# Patient Record
Sex: FEMALE | URBAN - NONMETROPOLITAN AREA
[De-identification: names, ages, dates, MRNs, and addresses within clinical notes are randomized per-mention and may not be internally consistent; named-entity substitution may affect disease eponyms.]

---

## 2017-01-01 ENCOUNTER — HISTORY (OUTPATIENT)
Dept: INTERNAL MEDICINE | Facility: OTHER | Age: 82
End: 2017-01-01

## 2017-01-01 ENCOUNTER — HISTORY (OUTPATIENT)
Dept: EMERGENCY MEDICINE | Facility: OTHER | Age: 82
End: 2017-01-01

## 2017-01-01 ENCOUNTER — OFFICE VISIT - GICH (OUTPATIENT)
Dept: INTERNAL MEDICINE | Facility: OTHER | Age: 82
End: 2017-01-01

## 2017-01-01 ENCOUNTER — AMBULATORY - GICH (OUTPATIENT)
Dept: GERIATRICS | Facility: OTHER | Age: 82
End: 2017-01-01

## 2017-01-01 ENCOUNTER — HOSPITAL - GICH (OUTPATIENT)
Dept: LAB | Facility: OTHER | Age: 82
End: 2017-01-01

## 2017-01-01 ENCOUNTER — AMBULATORY - GICH (OUTPATIENT)
Dept: SCHEDULING | Facility: OTHER | Age: 82
End: 2017-01-01

## 2017-01-01 ENCOUNTER — AMBULATORY - GICH (OUTPATIENT)
Dept: LAB | Facility: OTHER | Age: 82
End: 2017-01-01

## 2017-01-01 DIAGNOSIS — I77.1 STRICTURE OF ARTERY (H): ICD-10-CM

## 2017-01-01 DIAGNOSIS — R10.9 ABDOMINAL PAIN: ICD-10-CM

## 2017-01-01 DIAGNOSIS — I10 ESSENTIAL (PRIMARY) HYPERTENSION: ICD-10-CM

## 2017-01-01 DIAGNOSIS — Z78.9 OTHER SPECIFIED HEALTH STATUS (CODE): ICD-10-CM

## 2017-01-01 DIAGNOSIS — G47.9 SLEEP DISORDER: ICD-10-CM

## 2017-01-01 DIAGNOSIS — Z66 DO NOT RESUSCITATE: ICD-10-CM

## 2017-01-01 DIAGNOSIS — Z00.00 ENCOUNTER FOR GENERAL ADULT MEDICAL EXAMINATION WITHOUT ABNORMAL FINDINGS: ICD-10-CM

## 2017-01-01 DIAGNOSIS — R40.1 STUPOR: ICD-10-CM

## 2017-01-01 DIAGNOSIS — I35.9 NONRHEUMATIC AORTIC VALVE DISORDER: ICD-10-CM

## 2017-01-01 DIAGNOSIS — M53.3 SACROCOCCYGEAL DISORDERS, NOT ELSEWHERE CLASSIFIED: ICD-10-CM

## 2017-01-01 DIAGNOSIS — F02.818 DEMENTIA IN OTHER DISEASES CLASSIFIED ELSEWHERE WITH BEHAVIORAL DISTURBANCE: ICD-10-CM

## 2017-01-01 DIAGNOSIS — F41.9 ANXIETY DISORDER: ICD-10-CM

## 2017-01-01 DIAGNOSIS — G30.1 ALZHEIMER'S DISEASE WITH LATE ONSET (CODE) (H): ICD-10-CM

## 2017-01-01 DIAGNOSIS — Z86.73 PERSONAL HISTORY OF TRANSIENT ISCHEMIC ATTACK (TIA), AND CEREBRAL INFARCTION WITHOUT RESIDUAL DEFICITS: ICD-10-CM

## 2017-01-01 LAB
BACTERIA URINE: ABNORMAL BACTERIA/HPF
BILIRUB UR QL: NEGATIVE
CLARITY, URINE: CLEAR CLARITY
COLOR UR: YELLOW COLOR
EPITHELIAL CELLS: ABNORMAL EPI/HPF
GLUCOSE URINE: NEGATIVE MG/DL
KETONES UR QL: NEGATIVE MG/DL
LEUKOCYTE ESTERASE URINE: ABNORMAL
NITRITE UR QL STRIP: NEGATIVE
OCCULT BLOOD,URINE - HISTORICAL: NEGATIVE
PH UR: 6.5 [PH]
PROTEIN QUALITATIVE,URINE - HISTORICAL: NEGATIVE MG/DL
RBC - HISTORICAL: ABNORMAL /HPF
SODIUM SERPL-SCNC: 132 MMOL/L (ref 133–143)
SP GR UR STRIP: 1.01
UROBILINOGEN,QUALITATIVE - HISTORICAL: NORMAL EU/DL
WBC - HISTORICAL: ABNORMAL /HPF

## 2017-01-01 ASSESSMENT — PATIENT HEALTH QUESTIONNAIRE - PHQ9: SUM OF ALL RESPONSES TO PHQ QUESTIONS 1-9: 0

## 2018-01-03 NOTE — PROGRESS NOTES
Patient Information     Patient Name MRN Yolanda Bruce [id number] Female 1922      Progress Notes by Zakiya Johnson NP at 2017  4:00 AM     Author:  Zakiya Johnson NP Service:  (none) Author Type:  PHYS- Nurse Practitioner     Filed:  2017 12:42 PM Encounter Date:  2017 Status:  Signed     :  Zakiya Johnson NP (PHYS- Nurse Practitioner)            This note has been dictated. The encounter number is [phone].

## 2018-01-03 NOTE — H&P
Patient Information     Patient Name MRN Yolanda Bruce [id number] Female 1922      H&P signed by Zakiya Johnson NP at 2017  7:30 AM      Author:  Zakiya Johnson NP Service:  (none) Author Type:  PHYS- Nurse Practitioner     Filed:  2017  7:30 AM Encounter Date:  2017 Status:  Signed     :  Zakiya Johnson NP (PHYS- Nurse Practitioner)            -  DATE OF SERVICE:  2017    [facility]     CHIEF COMPLAINT  60 day recertification.     HISTORY OF PRESENT ILLNESS  In January Yolanda had 2 strokes. The first was a TIA and the second was a CVA. She has known aortic stenosis. Since her second CVA, she was placed on hospice. This started on February 3. At that time she had change in her medications. She is now only on amlodipine, Remeron, Keppra and morphine which she takes for back pain. Her daughter-in-law is present today and feels that she is doing well and has been comfortable. She has been applying a topical medicated ointment such as Bengay to her back and that seems to help. Yolanda does appear to be in her usual state of health today. She does have dementia and chronic anxiety. I spoke with daughter-in-law and she felt that amlodipine and Remeron should be continued. She has not had any episodes of seizure disorder. Past medical history, allergies, and medications are reviewed.     PHYSICAL EXAMINATION  GENERAL: Pleasant female, lying in bed. She appears comfortable.   VITAL SIGNS: Blood pressure 91/56. SPO2 95% on room air. Pulse 80, respiratory rate 18.   SKIN: Color pink.   MOUTH: Mucous membranes moist.   NECK: Supple, without adenopathy.   LUNGS: Fields clear to auscultation.   CARDIOVASCULAR: Regular rate and rhythm with a 3/6 systolic ejection murmur.   ABDOMEN: Soft and without masses, tenderness, and organomegaly.   EXTREMITIES: Without edema. She does not appear to have any discomfort at this time.     ASSESSMENT  1. Cerebrovascular  accident.   2. Aortic stenosis.   3. Hypertension.   4. Dementia.   5. Chronic anxiety disorder.     PLAN  We will just continue hospice services. This was discussed with daughter-in-law and family does want this to continue. No other medication changes at this time.     AMIRA ANAYA/laureano   D:  2017 11:45:00  T:  2017 11:53:04  Voice Job ID:  [id number]  Text Job ID:  [id number]  cc:NURSING HOME  LENORE GILLIS MD, PRIMARY PHYSICIAN         [facility] [place], [place]:  CELESTE VELA  MR#:  [id number]  :  1922  DATE:  2017  LOCATION:  [facility]  ROOM:    TYPE:  CLINURSING HOME REPORTPage 1 of 2

## 2018-01-03 NOTE — PROGRESS NOTES
Patient Information     Patient Name MRN Yolanda Bruce [id number] Female 1922      Progress Notes by Zakiya Johnson NP at 2017  5:40 AM     Author:  Zakiya Johnson NP Service:  (none) Author Type:  PHYS- Nurse Practitioner     Filed:  2017  7:29 AM Encounter Date:  2017 Status:  Signed     :  Zakiya Johnson NP (PHYS- Nurse Practitioner)            This note has been dictated. The encounter number is [phone].

## 2018-01-03 NOTE — PATIENT INSTRUCTIONS
Patient Information     Patient Name MRN Sex Yolanda Mac [id number] Female 1922      Patient Instructions by Khanh Lujan MD at 2017 11:10 AM     Author:  Khanh Lujan MD  Service:  (none) Author Type:  Physician     Filed:  2017 12:07 PM  Encounter Date:  2017 Status:  Addendum     :  Khanh Lujan MD (Physician)        Related Notes: Original Note by Khanh Lujan MD (Physician) filed at 2017 12:02 PM            Blood pressures are quite low at this time.     Stop:   - Chlorthalidone 25 mg daily  - Lisinopril 40 mg daily    Reduce Remeron to 7.5 mg at bedtime.     Change Ativan/Lorazepam to twice daily as needed for anxiety.    Change tramadol/Ultram to twice daily as needed for pain.    Consider possible seizures with history of TIA/mini strokes.  Start Keppra 500 mg twice daily.    Consider hospice consult if continued decline.  -- referral sent.     Bailee Johnson NP to see at [facility] in next 7-14 days    Return as needed for follow-up with Dr. Lujan.    Clinic : [phone]  Appointment line: [phone]

## 2018-01-03 NOTE — NURSING NOTE
Patient Information     Patient Name MRN Yolanda Bruce [id number] Female 1922      Nursing Note by Mela Shaffer at 2017 11:10 AM     Author:  Mela Shaffer Service:  (none) Author Type:  (none)     Filed:  2017 11:57 AM Encounter Date:  2017 Status:  Signed     :  Mela Shaffer            Patient presents to the clinic for follow up ED visit on 17, DX: TIA.  Family feels that patient is not as active now as she was prior to TIA's.    Mela Shaffer LPN        2017 11:40 AM

## 2018-01-03 NOTE — PROGRESS NOTES
Patient Information     Patient Name MRN Sex Yolanda Mac [id number] Female 1922      Progress Notes by Khanh Lujan MD at 2017 11:10 AM     Author:  Khanh Lujan MD Service:  (none) Author Type:  Physician     Filed:  2017  7:41 PM Encounter Date:  2017 Status:  Signed     :  Khanh Lujan MD (Physician)            Nursing Notes:   Mela Shaffer  2017 11:57 AM  Signed  Patient presents to the clinic for follow up ED visit on 17, DX: TIA.  Family feels that patient is not as active now as she was prior to TIA's.    Mela Shaffer LPN        2017 11:40 AM    Yolanda Chatman presents to clinic today for:   Chief Complaint    Patient presents with      Follow Up     HPI: Ms. Chatman is a 89 y.o. female who presents today for evaluation of above.     (R40.1) Obtundation  (primary encounter diagnosis)  (M53.3) Pain of both sacroiliac joints  (F41.9) Anxiety  (G47.9) Sleep difficulties  (Z86.73) History of TIA (transient ischemic attack)  (Z78.9) DNI (do not intubate)  (Z66) DNR (do not resuscitate)  (G30.1,  F02.81) Late onset Alzheimer's disease with behavioral disturbance - Advanced Dementia  (Z59.3) Nursing home resident - [facility]  (I77.1) Subclavian artery stenosis, right (HC) -- ONLY CHECK BLOOD PRESSURE ON -- LEFT -- ARM     Obtundation, patient presents from nursing home due to significant decline in function for the past 2-3 weeks.  Daughter-in-law is with today, Marina.  Patient is quite obtunded and is not able to participate in many history gathering.  She was last seen by myself within the last month or 2 and other than having severe anxiety, she was fairly mobile and functional.  She ambulated using her fetus in her wheelchair and was able to transfer herself to the bathroom.    There is some concern she may have had some TIAs or mini strokes recently.  There is also some concern she may be having some seizures.  No one has ever  really witness any seizure activity but she's been quite obtunded.  Evidently a couple weeks ago she had a syncopal episode, no injury was noted.  Family is concerned that there may been a TIA.  Patient has had TIAs in the past.    No recent medications were changed after recent ER visit.  She was given some IV fluids for IV hydration due to elevated BUN/creatinine other than this was continued on her chlorthalidone.    Uncontrolled hypertension, has been an issue.  Her medications have been slowly titrated over the last 6 months.  Currently on 25 mg her chlorthalidone, also taking lisinopril, metoprolol.    Regarding sedatives she is on mirtazapine/Remeron, also on tramadol, lorazepam/Ativan.    Patient has history of severe anxiety almost to the point of having severe behavioral disturbances.  She was having hallucinations at one point per family's recollection.    Per review of emergency room notes from 1/13/2017 patient had a brief unresponsive spell followed by left-sided weakness.  Evidently she was unresponsive for several minutes.  After she became a responsive once again she had some left-sided facial droop and weakness in her left arm.  No speech difficulties were noted per emergency room note.    She is brought into the emergency room by ambulance.  Symptoms had resolved within 10 or 15 minutes.      Rapid atrial flutter was noted on the ambulance cardiac monitor.  Rhythm strip was obtained.    Blood pressures cannot be obtained on her right arm due to subclavian stenosis.  She gets very anxious and her blood pressure skyrocket.  Recently medications have been titrated.    Today she is running quite low blood pressure, once again she is essentially obtunded however.  She is not able to really communicate.  She does get out a few mumbled/jumbled words but otherwise is sleeping and drooling.  Her nose is dripping with clear rhinorrhea.    Review of code status, DNR/DNI.  Patient appears to have advanced  dementia.  She currently resides at [facility].    Due to multiple new issues, reduced consciousness.  Needs multiple medication changes.    I was able to reach patient's daughter/POA-Ana Paula.  I spoke with Ana Paula for about 25 minutes today after meeting with her mother.  At this time, plan for medication changes as noted.  Conservative measures.  Either hospice admission versus comfort care medications.  Advised daughter/POA to meet and talk with hospice and find out if this would be a reasonable option versus simply comfort care measures being written by myself or Bailee Johnson NP.  Daughter had many questions.  She was satisfied with the plan is noted.  Hospice referral sent.    Ms. Chatman's There is no height or weight on file to calculate BMI. This is out of the normal range for a 89 y.o. Normal range for ages 18-64 is between 18.5 and 24.9; normal range for ages 65+ is 23-30. To lose weight we reviewed risks and benefits of appropriate options such as diet, exercise, and medications. Patient's strategy will be  none; patient is not ready to act   BP Readings from Last 1 Encounters:01/31/17 : 100/68  Ms. Madera blood pressure is within the normal range for adults. Per JNC-8 guidelines normal adult blood pressure is < 120/80, pre-hypertensive is between 120/80 and 139/89, and hypertension is 140/90 or greater.    SONIA:  SONIA-7 ANXIETY SCREENING 11/11/2015   SONIA date (doc flow) 11/11/2015   Nervous, anxious 3   Cannot stop worrying 3   Worry about different things 3   Cannot relax 3   Feeling restless 3   Easily annoyed/irritated 3   Afraid of awful event 2   Score 20   Severity severe anxiety       PHQ9:  PHQ Depression Screening 11/11/2015 1/31/2017   Date of PHQ exam (doc flow) 11/11/2015 1/31/2017   1. Lack of interest/pleasure 1 - Several days 0 - Not at all   2. Feeling down/depressed 1 - Several days 0 - Not at all   PHQ-2 TOTAL SCORE 2 0   3. Trouble sleeping 1 - Several days 0 - Not at all    4. Decreased energy 1 - Several days 0 - Not at all   5. Appetite change 0 - Not at all 0 - Not at all   6. Feelings of failure 0 - Not at all 0 - Not at all   7. Trouble concentrating 1 - Several days 0 - Not at all   8. Activity level 0 - Not at all 0 - Not at all   9. Hurting yourself 3 - Nearly every day 0 - Not at all   PHQ-9 TOTAL SCORE 8 0   PHQ-9 Severity Level mild none   Functional Impairment very difficult not difficult at all        I have personally reviewed the past medical history, past surgical history, medications, allergies, family and social history as listed below, on 2017.    Patient Active Problem List       Diagnosis  Date Noted     Obtundation  2017     Uncontrolled hypertension  2016     Subclavian artery stenosis, right (HC) -- ONLY CHECK BLOOD PRESSURE ON -- LEFT -- ARM  2016     Statin intolerance  2016     Onychomycosis  2015     SI joint arthritis (HC)  2015     Multiple actinic keratoses - Face / Nose - x4 on nose  2015     Grief reaction - Son Recently  as of 2014     Anxiety  10/28/2014     Nursing home resident - [facility]  2014     DNR (do not resuscitate)  2014     DNI (do not intubate)  2014     H/O: CVA (cerebrovascular accident)  2014     Chronic back pain  2014     ACP (advance care planning)  2013     HYPERTENSION       B12 DEFICIENCY       PAGET'S DISEASE       of the bone, stable          HYPERCHOLESTEROLEMIA       controlled          Alzheimer's dementia with behavioral disturbance - Advanced Dementia       DEGENERATIVE DISC DISEASE, LUMBAR SPINE       FAMILIAL TREMOR       AORTIC STENOSIS, MODERATE       Past Medical History      Diagnosis   Date     Hx of biopsy       breast, benign      X-ray exam       colon      Past Surgical History       Procedure   Laterality Date     Tonsillectomy        Hysterectomy        with appendectomy age 38       Flexible  sigmoidoscopy   2003     Colonoscopy screening   2004     & polypectomy (tubular adenoma) at 15 cm       Current Outpatient Prescriptions       Medication  Sig Dispense Refill     acetaminophen SR (TYLENOL ARTHRITIS) 650 mg Extended-Release tablet Take one tablet FOUR TIMES DAILY Max acetaminophen dose: 3000mg in 24 hrs.  0     amLODIPine (NORVASC) 10 mg tablet Take 1 tablet by mouth once daily.  0     aspirin 81 mg tablet Take 81 mg by mouth once daily with a meal.       Cholecalciferol, Vitamin D3, (VITAMIN D-3) 2,000 unit tablet Take 2,000 Units by mouth once daily.       cyanocobalamin 1000 mcg in 1 ml (VITAMIN B12) 1,000 mcg/mL Inject 1,000 mcg intramuscular every 4 weeks.       docusate (COLACE) 100 mg capsule Take 2 capsules by mouth once daily. While taking Ultram for constipation prevention -- Hold for Loose Stools 180 capsule 3     lanolin-mineral oil-nacl-petrolatum (LUBRISKIN) lotion Apply  topically to affected area(s) each time if needed for Dry Skin.  0     levETIRAcetam (KEPPRA) 500 mg tablet Take 1 tablet by mouth 2 times daily. - for possible seizures 60 tablet 3     LORazepam (ATIVAN) 0.5 mg tab 1/2 tablet (0.25 mg) every 12 hours prn for anxiety 10 tablet 3     medication order composer VITALITY ESSENTIAL PACKET  1 PACKET DAILY IN THE AM AND PM       metoprolol succinate (TOPROL XL) 100 mg Sustained-Release tablet 100 mg am and 50 mg at supper time 45 tablet 0     mirtazapine (REMERON) 7.5 mg tablet Take 1 tablet by mouth at bedtime. 30 tablet 1     traMADol (ULTRAM) 50 mg tablet Take 1 tablet by mouth 2 times daily. AS NEEDED for Pain - Dose change 1/31/2017 20 tablet 0     Vit Z-Ygp-Irgp-Bio-B&C-Hb 111 (VARISAN VITALITY) 86-35- unit-mg-mg-mg tab Vitality omega 3 cream delight -- give 2 tablespoons daily for health care maintenance.  0     Allergies     Allergen  Reactions     Lipitor [Atorvastatin] Confusion     Family History       Problem   Relation Age of Onset     Cancer-breast   "Sister      4, 1 alive, 2  of breast cancer       Stroke  Mother      from stroke       Other  Father      carcinoma unknown primary       Family Status     Relation  Status     Father  at age 70     Mother  at age 85     Sister      of breast cancer      Sister      of breast cancer      Sister Alive     Brother      Brother      Brother Alive     Brother Alive     Social History     Social History        Marital status:  Unknown     Spouse name: N/A     Number of children:  N/A     Years of education:  N/A     Social History Main Topics       Smoking status: Never Smoker     Smokeless tobacco: Never Used     Alcohol use No     Drug use: No     Sexual activity: Not on file     Other Topics  Concern     Not on file      Social History Narrative     Currently living at [facility].     Son helped with decisions and takes care of her personal dealings.      She is a . Lived in [place] most of life.        Daughter Ana Paula is POA -- home [phone] and mobile: [phone]     ROS -- unable, given patient's clinical status. + Obtundation, dementia.    EXAM:   Vitals:     17 1145   BP: 100/68   Pulse: 60   Temp: 97.7  F (36.5  C)   TempSrc: Tympanic     BP Readings from Last 3 Encounters:    17 100/68   17 126/72   17 103/59     Wt Readings from Last 3 Encounters:    17 72.6 kg (160 lb 0.9 oz)   17 72.6 kg (160 lb)   11/11/15 70 kg (154 lb 6 oz)     Estimated body mass index is 26.67 kg/(m^2) as calculated from the following:    Height as of 17: 1.65 m (5' 4.96\").    Weight as of 17: 72.6 kg (160 lb 0.9 oz).     EXAM:  Constitutional: Obtunded, sleeping, somewhat arousable but falls back to sleep.  Drooling from the mouth at times.  Clear rhinorrhea from the nose at times.  Currently in a wheelchair.  Lymphatic Exam: Non-palpable nodes in neck, clavicular regions  Pulmonary: Lungs are clear, no focal " rales or wheezing.  Cardiovascular Exam: Regular rate and rhythm.  Trace edema.  Musculoskeletal Exam: Head is leaned over weight to the left.  Manually attempting to help patient straighten her head she does fight somewhat but then it goes back to the same position.  Psychiatric Exam: Rather obtunded.  Minimally responsive.  She is arousable but falls back to sleep quickly.    INVESTIGATIONS:  Results for orders placed or performed during the hospital encounter of 01/27/17       aPTT      Result  Value Ref Range    APTT 26 26 - 39 sec   Comprehensive Metabolic Panel      Result  Value Ref Range    SODIUM 129 (L) 133 - 143 mmol/L    POTASSIUM 4.3 3.5 - 5.1 mmol/L    CHLORIDE 98 98 - 107 mmol/L    CO2,TOTAL 28 21 - 31 mmol/L    ANION GAP 3 (L) 5 - 18                    GLUCOSE 112 (H) 70 - 105 mg/dL    CALCIUM 9.1 8.6 - 10.3 mg/dL    BUN 35 (H) 7 - 25 mg/dL    CREATININE 1.64 (H) 0.70 - 1.30 mg/dL    BUN/CREAT RATIO           21                    GFR if African American 35 (L) >60 ml/min/1.73m2    GFR if not African American 29 (L) >60 ml/min/1.73m2    ALBUMIN 3.9 3.5 - 5.7 g/dL    PROTEIN,TOTAL 6.2 (L) 6.4 - 8.9 g/dL    GLOBULIN                  2.3 2.0 - 3.7 g/dL    A/G RATIO 1.7 1.0 - 2.0                    BILIRUBIN,TOTAL 0.3 0.3 - 1.0 mg/dL    ALK PHOSPHATASE 48 34 - 104 IU/L    ALT (SGPT) 29 7 - 52 IU/L    AST (SGOT) 23 13 - 39 IU/L   Protime - INR      Result  Value Ref Range    INR 1.1 <1.3    PROTIME 11.6 (L) 11.9 - 15.2 sec   Troponin I      Result  Value Ref Range    TROPONIN I <0.030 <0.034 ng/mL   CBC WITH AUTO DIFFERENTIAL      Result  Value Ref Range    WHITE BLOOD COUNT         7.7 4.5 - 11.0 thou/cu mm    RED BLOOD COUNT           3.98 (L) 4.00 - 5.20 mil/cu mm    HEMOGLOBIN                13.1 12.0 - 16.0 g/dL    HEMATOCRIT                39.4 33.0 - 51.0 %    MCV                       99 80 - 100 fL    MCH                       32.8 26.0 - 34.0 pg    MCHC                      33.2 32.0 - 36.0 g/dL     RDW                       11.8 11.5 - 15.5 %    PLATELET COUNT            238 140 - 440 thou/cu mm    MPV                       7.3 6.5 - 11.0 fL    NEUTROPHILS               48.0 42.0 - 72.0 %    LYMPHOCYTES               37.4 20.0 - 44.0 %    MONOCYTES                 9.0 <12.0 %    EOSINOPHILS               3.9 <8.0 %    BASOPHILS                 1.7 <3.0 %    ABSOLUTE NEUTROPHILS      3.7 1.7 - 7.0 thou/cu mm    ABSOLUTE LYMPHOCYTES      2.9 0.9 - 2.9 thou/cu mm    ABSOLUTE MONOCYTES        0.7 <0.9 thou/cu mm    ABSOLUTE EOSINOPHILS      0.3 <0.5 thou/cu mm    ABSOLUTE BASOPHILS        0.1 <0.3 thou/cu mm       ASSESSMENT AND PLAN:  Yolanda was seen today for follow up.    Diagnoses and all orders for this visit:    Obtundation    Pain of both sacroiliac joints  -     traMADol (ULTRAM) 50 mg tablet; Take 1 tablet by mouth 2 times daily. AS NEEDED for Pain - Dose change 1/31/2017    Anxiety  -     LORazepam (ATIVAN) 0.5 mg tab; 1/2 tablet (0.25 mg) every 12 hours prn for anxiety    Sleep difficulties  -     mirtazapine (REMERON) 7.5 mg tablet; Take 1 tablet by mouth at bedtime.    History of TIA (transient ischemic attack)  -     levETIRAcetam (KEPPRA) 500 mg tablet; Take 1 tablet by mouth 2 times daily. - for possible seizures    DNI (do not intubate)    DNR (do not resuscitate)    Late onset Alzheimer's disease with behavioral disturbance - Advanced Dementia  -     AMB CONSULT TO HOSPICE; Future    Nursing home resident - [facility]    Subclavian artery stenosis, right (HC) -- ONLY CHECK BLOOD PRESSURE ON -- LEFT -- ARM    -- Expected clinical course discussed     60 minutes total spent in face-to-face interaction with patient, daughter in law, and daughter (POA) Ana Paula (separate from separately billed procedures) with greater than 50% spent in counseling and care coordination of listed medical problems.      Return if symptoms worsen or fail to improve, for -- follow-up with Bailee Eagle in 7 to  14 days. .    Patient Instructions   Blood pressures are quite low at this time.     Stop:   - Chlorthalidone 25 mg daily  - Lisinopril 40 mg daily    Reduce Remeron to 7.5 mg at bedtime.     Change Ativan/Lorazepam to twice daily as needed for anxiety.    Change tramadol/Ultram to twice daily as needed for pain.    Consider possible seizures with history of TIA/mini strokes.  Start Keppra 500 mg twice daily.    Consider hospice consult if continued decline.  -- referral sent.     Bailee Johnson NP to see at [facility] in next 7-14 days    Return as needed for follow-up with Dr. Lujan.    Clinic : [phone]  Appointment line: [phone]     Khanh Lujan MD

## 2018-01-03 NOTE — H&P
Patient Information     Patient Name MRN Yolanda Bruce [id number] Female 1922      H&P signed by Zakiya Johnson NP at 2017  7:14 AM      Author:  Zakiya Johnson NP Service:  (none) Author Type:  PHYS- Nurse Practitioner     Filed:  2017  7:14 AM Encounter Date:  2017 Status:  Signed     :  Zakiya Johnson NP (PHYS- Nurse Practitioner)            -  DATE OF SERVICE:  2017    [facility]    CHIEF COMPLAINT   Followup emergency room visit for TIA, atrial fibrillation and urinary tract infection.     HISTORY OF PRESENT ILLNESS  The resident was seen last week for the above-mentioned problems. She had an unresponsive episode. She was found to be in atrial fibrillation. Her rate was high but then repeat EKG showed controlled atrial fibrillation. She was also diagnosed with urinary tract infection. She was mildly hyponatremic with a sodium of 132. She was placed on chlorthalidone at the end of December by her primary doctor for management of hypertension. She is complaining at this visit of some abdominal pain. She points to just below the belly button as being the area of discomfort. She is on Bactrim DS twice daily for UTI. I do not see a urine culture in her chart. Urinalysis at ER visit showed 11 to 25 white blood cells. She otherwise has been afebrile and there has been no nausea, vomiting, or bowel changes. She has been moving her bowels without difficulty.    Past medical history, allergies, and medications are reviewed.     PHYSICAL EXAMINATION  GENERAL: Pleasant female, lying in bed without distress. She is in her usual state of health, but does complain of abdominal discomfort.   SKIN: Skin color pink. Mucous membranes moist.   NECK: Supple, without adenopathy.   LUNGS: Lung fields clear to auscultation.   CARDIOVASCULAR: Irregular with a controlled rate.   ABDOMEN: Soft and without masses or organomegaly. She does have some mild  suprapubic tenderness. She has full range of motion of upper and lower extremities. No weakness.   EXTREMITIES: Without edema.   VITAL SIGNS: Blood pressure 120/74. SPO2 93% on room air. Pulse 18, pulse is 70, respiratory rate 18. Temperature 98.5 degrees. Weight 161 pounds.     ASSESSMENT  1. TIA.   2. Atrial fibrillation.   3. Hypertension.   4. Dementia.   5. Hyponatremia.         PLAN  Will continue with the addition of metoprolol as prescribed in the emergency department. She was mildly hyponatremic so we will get a sodium level this week. Also recommend repeating UA/UC since she is complaining of abdominal pain over the suprapubic region and she is currently on antibiotics for UTI. I do not see a urine culture present in chart. May need further treatment. If her abdominal pain worsens, she definitely needs to have further evaluation.       AMIRA ANAYA   D:  2017 12:08:52  T:  2017 12:23:50  Voice Job ID:  [id number]  Text Job ID:  [id number]  cc:[facility] HOME  LENORE GILLIS MD, PRIMARY PHYSICIAN         [facility] [place], [place]NAME:  CELESTE VELA  MR#:  [id number]  :  1922  DATE:  2017  LOCATION:  [facility]  ROOM:    TYPE:  CLINURSING HOME REPORTPage 1

## 2018-01-25 VITALS — DIASTOLIC BLOOD PRESSURE: 68 MMHG | HEART RATE: 60 BPM | TEMPERATURE: 97.7 F | SYSTOLIC BLOOD PRESSURE: 100 MMHG

## 2018-01-31 ASSESSMENT — PATIENT HEALTH QUESTIONNAIRE - PHQ9: SUM OF ALL RESPONSES TO PHQ QUESTIONS 1-9: 0
